# Patient Record
Sex: FEMALE | Race: BLACK OR AFRICAN AMERICAN | NOT HISPANIC OR LATINO | Employment: OTHER | URBAN - METROPOLITAN AREA
[De-identification: names, ages, dates, MRNs, and addresses within clinical notes are randomized per-mention and may not be internally consistent; named-entity substitution may affect disease eponyms.]

---

## 2020-01-29 ENCOUNTER — OFFICE VISIT (OUTPATIENT)
Dept: FAMILY MEDICINE | Facility: CLINIC | Age: 55
End: 2020-01-29
Payer: MEDICARE

## 2020-01-29 VITALS
DIASTOLIC BLOOD PRESSURE: 82 MMHG | HEIGHT: 61 IN | HEART RATE: 73 BPM | BODY MASS INDEX: 26.87 KG/M2 | SYSTOLIC BLOOD PRESSURE: 119 MMHG | OXYGEN SATURATION: 97 % | WEIGHT: 142.31 LBS | TEMPERATURE: 98 F

## 2020-01-29 DIAGNOSIS — R23.2 HOT FLASHES: ICD-10-CM

## 2020-01-29 DIAGNOSIS — N95.1 PERIMENOPAUSAL: ICD-10-CM

## 2020-01-29 DIAGNOSIS — G24.4 MEIGE'S SYNDROME (BLEPHAROSPASM WITH OROMANDIBULAR DYSTONIA): ICD-10-CM

## 2020-01-29 DIAGNOSIS — Z12.31 ENCOUNTER FOR SCREENING MAMMOGRAM FOR MALIGNANT NEOPLASM OF BREAST: ICD-10-CM

## 2020-01-29 DIAGNOSIS — D50.8 OTHER IRON DEFICIENCY ANEMIA: ICD-10-CM

## 2020-01-29 DIAGNOSIS — I10 ESSENTIAL HYPERTENSION: ICD-10-CM

## 2020-01-29 DIAGNOSIS — R73.09 ABNORMAL GLUCOSE: ICD-10-CM

## 2020-01-29 DIAGNOSIS — E66.3 OVERWEIGHT (BMI 25.0-29.9): ICD-10-CM

## 2020-01-29 DIAGNOSIS — Z00.00 ANNUAL PHYSICAL EXAM: Primary | ICD-10-CM

## 2020-01-29 PROBLEM — D50.9 IRON DEFICIENCY ANEMIA: Status: ACTIVE | Noted: 2020-01-29

## 2020-01-29 PROCEDURE — 99203 OFFICE O/P NEW LOW 30 MIN: CPT | Mod: PBBFAC,PO | Performed by: FAMILY MEDICINE

## 2020-01-29 PROCEDURE — 99999 PR PBB SHADOW E&M-NEW PATIENT-LVL III: CPT | Mod: PBBFAC,,, | Performed by: FAMILY MEDICINE

## 2020-01-29 PROCEDURE — 99203 OFFICE O/P NEW LOW 30 MIN: CPT | Mod: 25,S$PBB,, | Performed by: FAMILY MEDICINE

## 2020-01-29 PROCEDURE — 99386 PR PREVENTIVE VISIT,NEW,40-64: ICD-10-PCS | Mod: S$PBB,,, | Performed by: FAMILY MEDICINE

## 2020-01-29 PROCEDURE — 99999 PR PBB SHADOW E&M-NEW PATIENT-LVL III: ICD-10-PCS | Mod: PBBFAC,,, | Performed by: FAMILY MEDICINE

## 2020-01-29 PROCEDURE — 99203 PR OFFICE/OUTPT VISIT, NEW, LEVL III, 30-44 MIN: ICD-10-PCS | Mod: 25,S$PBB,, | Performed by: FAMILY MEDICINE

## 2020-01-29 PROCEDURE — 99386 PREV VISIT NEW AGE 40-64: CPT | Mod: S$PBB,,, | Performed by: FAMILY MEDICINE

## 2020-01-29 RX ORDER — HYDROCHLOROTHIAZIDE 25 MG/1
TABLET ORAL
COMMUNITY
Start: 2020-01-08 | End: 2020-01-29 | Stop reason: SDUPTHER

## 2020-01-29 RX ORDER — NAPROXEN 500 MG/1
TABLET ORAL
COMMUNITY
Start: 2017-06-09 | End: 2020-01-29

## 2020-01-29 RX ORDER — CARBIDOPA AND LEVODOPA 25; 100 MG/1; MG/1
TABLET ORAL
COMMUNITY
Start: 2020-01-03

## 2020-01-29 RX ORDER — ASPIRIN 81 MG/1
81 TABLET ORAL
COMMUNITY

## 2020-01-29 RX ORDER — CARBIDOPA AND LEVODOPA 25; 100 MG/1; MG/1
2 TABLET ORAL
COMMUNITY
Start: 2019-12-02 | End: 2020-01-29 | Stop reason: SDUPTHER

## 2020-01-29 RX ORDER — GABAPENTIN 300 MG/1
CAPSULE ORAL
COMMUNITY
Start: 2019-03-07

## 2020-01-29 RX ORDER — FERROUS GLUCONATE 324(38)MG
TABLET ORAL
COMMUNITY
Start: 2017-06-23

## 2020-01-29 RX ORDER — HYDROCHLOROTHIAZIDE 25 MG/1
TABLET ORAL
COMMUNITY
Start: 2018-11-26 | End: 2020-02-14 | Stop reason: SDUPTHER

## 2020-01-29 RX ORDER — CYCLOBENZAPRINE HCL 10 MG
TABLET ORAL
COMMUNITY
Start: 2019-03-07

## 2020-01-29 RX ORDER — TERBINAFINE HYDROCHLORIDE 250 MG/1
TABLET ORAL
COMMUNITY
Start: 2019-02-07

## 2020-01-29 RX ORDER — CARBIDOPA AND LEVODOPA 25; 100 MG/1; MG/1
TABLET, EXTENDED RELEASE ORAL
COMMUNITY
Start: 2019-01-04 | End: 2020-01-29 | Stop reason: SDUPTHER

## 2020-01-29 NOTE — PROGRESS NOTES
Office Visit    Patient Name: Lindsey Griffith    : 1965  MRN: [id number]      Assessment/Plan:  Lindsey Griffith is a 54 y.o. female who presents today for :    Annual physical exam  -     Hemoglobin A1c; Future; Expected date: 2020  -     CBC Without Differential; Future; Expected date: 2020  -     Comprehensive metabolic panel; Future; Expected date: 2020  -     Lipid panel; Future; Expected date: 2020  Overweight (BMI 25.0-29.9)  Encounter for screening mammogram for malignant neoplasm of breast   -     Mammo Digital Screening Bilat; Future; Expected date: 2020  -anticipatory guidance provided with age appropriate preventative services discussed, healthy diet and regular physical exercise also discussed with patient  -any additional health maintenance will be readdressed at the next physical if declined or deferred by the patient today   -Recommend 15-30 minutes of moderate intensity exercise 5 days/week.                Follow up for any evaluation as needed.          Additional Evaluation & Management issues:     In addition to today's Annual Physical, patient has other medical issues that need to be addressed, as well as their associated prescription management that is separate from today's Physical  - as documented separately below the Annual Physical portion of this encounter.        This note was created by combination of typed  and MModal dictation.  Transcription errors may be present.  If there are any questions, please contact me.        ----------------------------------------------------------------------------------------------------------------------      HPI:  Patient Care Team:  Sudeep Lowe MD as PCP - General (Family Medicine)    Lindsey is a 54 y.o. female with      Patient Active Problem List   Diagnosis    Meige's syndrome (blepharospasm with oromandibular dystonia) - follows Neurology and ENT at [facility]    Iron deficiency anemia    Essential HTN     Overweight (BMI 25.0-29.9)    Hot flashes    Perimenopausal     This patient is new to me       Patient presents today for:  HTN follow up; Hot Flashes; and Establish Care    In addition to addressing the reasons for this office visit as above, which is further discussed and addressed in the separate E&M section of this note, patient also due for annual bloodwork today.  Health maintenance-wise, she is up to date on colon cancer screening at [facility] last month, JOSIE requested today for records. She is UTD on Pap as well w/ GYN, needs MMG later this year.  Otherwise, no major new changes in health since last checkupwith prior PCP.  She has h/o Megie's syndrome, for which she follows ENT and Neurology at [facility] - stable on current meds  Patient reports that BP well controlled at home, compliant with current medication regimen daily without any adverse side effects. She denies any cardiovascular or neurologic complaints today        Additional ROS  No F/C/wt changes/fatigue  No dysphagia/sore throat/rhinorrhea  No CP/POLLARD/palpitations/swelling  No cough/wheezing/SOB  No nausea/vomiting/abd pain/no diarrhea, no constipation, no blood in stool  No muscle aches/joint pain   No rashes  No MSK weakness/HA/tingling/numbness  No anxiety/depression, +hot flashes  No dysuria/hematuria  No polyuria/polydipsia/cold or hot intolerance          Current Medications  Medications reviewed and updated.       Current Outpatient Medications:     aspirin (ECOTRIN) 81 MG EC tablet, Take 81 mg by mouth., Disp: , Rfl:     carbidopa-levodopa  mg (SINEMET)  mg per tablet, , Disp: , Rfl:     cyclobenzaprine (FLEXERIL) 10 MG tablet, , Disp: , Rfl:     ferrous gluconate (FERGON) 324 MG tablet, , Disp: , Rfl:     gabapentin (NEURONTIN) 300 MG capsule, , Disp: , Rfl:     hydroCHLOROthiazide (HYDRODIURIL) 25 MG tablet, , Disp: , Rfl:     onabotulinumtoxina (BOTOX) 100 unit SolR, 5 Units., Disp: , Rfl:     terbinafine HCl (LAMISIL)  "250 mg tablet, , Disp: , Rfl:     History reviewed. No pertinent surgical history.    History reviewed. No pertinent family history.    Social History     Socioeconomic History    Marital status:      Spouse name: Not on file    Number of children: Not on file    Years of education: Not on file    Highest education level: Not on file   Occupational History    Not on file   Social Needs    Financial resource strain: Not on file    Food insecurity:     Worry: Not on file     Inability: Not on file    Transportation needs:     Medical: Not on file     Non-medical: Not on file   Tobacco Use    Smoking status: Never Smoker    Smokeless tobacco: Never Used   Substance and Sexual Activity    Alcohol use: Never     Frequency: Never    Drug use: Never    Sexual activity: Not on file   Lifestyle    Physical activity:     Days per week: Not on file     Minutes per session: Not on file    Stress: Not on file   Relationships    Social connections:     Talks on phone: Not on file     Gets together: Not on file     Attends Jainism service: Not on file     Active member of club or organization: Not on file     Attends meetings of clubs or organizations: Not on file     Relationship status: Not on file   Other Topics Concern    Not on file   Social History Narrative    Not on file           Allergies   Review of patient's allergies indicates:  No Known Allergies          Review of Systems  See HPI      Physical Exam  /82   Pulse 73   Temp 98 °F (36.7 °C)   Ht 5' 1" (1.549 m)   Wt 64.5 kg (142 lb 4.9 oz)   LMP 12/11/2019   SpO2 97%   BMI 26.89 kg/m²       GEN: NAD, well developed, pleasant, well nourished, +dysphonia  HEENT: NCAT, PERRLA, EOMI, sclera clear, anicteric, bilateral ear exam wnl, O/P clear, MMM with no lesions  NECK: normal, supple with midline trachea, no LAD, no thyromegaly  LUNGS: CTAB, no w/r/r, no increased work of breathing   HEART: RRR, normal S1 and S2, no m/r/g, no " edema  ABD: s/nt/nd, NABS  SKIN: normal turgor, no rashes  PSYCH: AOx3, appropriate mood and affect  MSK: warm/well perfused, normal ROM in all extremities, no c/c/e.  NEURO: normal without focal findings, CN II-XII are grossly intact.  Sensation/strength grossly normal, gait and station normal.         Labs  No results found for: LABA1C, HGBA1C  No results found for: NA, K, CL, CO2, BUN, CREATININE, CALCIUM, ANIONGAP, ESTGFRAFRICA, EGFRNONAA  No results found for: CHOL  No results found for: HDL  No results found for: LDLCALC  No results found for: TRIG  No results found for: CHOLHDL  Last set of blood work has been reviewed as noted above.          __________________________________________________________________________________________________________________________________      Additional Evaluation & Management issues:     In addition to today's Annual Physical, patient has other medical issues that need to be addressed, as well as their associated prescription management that is separate from today's Physical  - as documented separately below      HPI:    Patient presents today for:  HTN follow up; Hot Flashes; and Establish Care      HTN - patient is compliant with daily HCTZ with good BP control at home - no side effects. Denies CP/SOB/leg swelling.    She has h/o Megie's syndrome - with dyphonia - Dx'd around 2016, for which she follows ENT and Neurology at [facility] - stable on current meds, which includes Sinemet/baclofen/gabapentin, she was previously on Clonazepam but was taken off. JOSIE requested today for records from [facility]   Hot flashes - mild, LMP was over a month ago, her cycles have been get more infrequent. No vaginal dryness nor discomfort.      Additional ROS  No F/C/wt changes/fatigue  No CP/POLLARD/palpitations/swelling  No cough/wheezing/SOB  No nausea/vomiting/abd pain  No muscle aches/joint pain   No rashes  No MSK weakness/HA/tingling/numbness  +hot flashes              Review of Systems  See  "HPI        Physical Exam  /82   Pulse 73   Temp 98 °F (36.7 °C)   Ht 5' 1" (1.549 m)   Wt 64.5 kg (142 lb 4.9 oz)   LMP 12/11/2019   SpO2 97%   BMI 26.89 kg/m²       GEN: NAD, well developed, pleasant, well nourished, +dysphonia  HEENT: NCAT, PERRLA, EOMI, sclera clear, anicteric  NECK: normal, supple with midline trachea, no LAD, no thyromegaly  LUNGS: CTAB, no w/r/r, no increased work of breathing   HEART: RRR, normal S1 and S2, no m/r/g, no edema  ABD: s/nt/nd, NABS  SKIN: normal turgor, no rashes  PSYCH: AOx3, appropriate mood and affect  MSK: warm/well perfused, normal ROM in all extremities, no c/c/e.                      Assessment/Plan:  Lindsey Griffith is a 54 y.o. female who presents today for :      Essential HTN  -     Hemoglobin A1c; Future; Expected date: 01/29/2020  -     CBC Without Differential; Future; Expected date: 01/29/2020  -     Comprehensive metabolic panel; Future; Expected date: 01/29/2020  -     Lipid panel; Future; Expected date: 01/29/2020  Overweight (BMI 25.0-29.9)  -continue current medication regimen  -DASH diet, regular cardiovascular exercises  -counseled on weight loss      Other iron deficiency anemia  -     CBC Without Differential; Future; Expected date: 01/29/2020  -     Ferritin; Future; Expected date: 01/29/2020  -     Iron and TIBC; Future; Expected date: 01/29/2020  -recheck labs    Meige's syndrome (blepharospasm with oromandibular dystonia) - follows Neurology and ENT at [facility]  -stable, continue current medication regimen PRN  -f/u ENT/Nuurology as needed  JOSIE requested today for [facility] records.        Hot flashes  Perimenopausal  -mild, pt declined medications, advised regular physcial activities and diet for now  Follow up PRN.  "

## 2020-01-30 ENCOUNTER — TELEPHONE (OUTPATIENT)
Dept: FAMILY MEDICINE | Facility: CLINIC | Age: 55
End: 2020-01-30

## 2020-01-30 ENCOUNTER — LAB VISIT (OUTPATIENT)
Dept: LAB | Facility: HOSPITAL | Age: 55
End: 2020-01-30
Attending: FAMILY MEDICINE
Payer: MEDICARE

## 2020-01-30 DIAGNOSIS — D50.8 OTHER IRON DEFICIENCY ANEMIA: ICD-10-CM

## 2020-01-30 DIAGNOSIS — Z00.00 ANNUAL PHYSICAL EXAM: ICD-10-CM

## 2020-01-30 DIAGNOSIS — I10 ESSENTIAL HYPERTENSION: ICD-10-CM

## 2020-01-30 DIAGNOSIS — E78.2 MIXED HYPERLIPIDEMIA: Primary | ICD-10-CM

## 2020-01-30 DIAGNOSIS — R73.03 PREDIABETES: ICD-10-CM

## 2020-01-30 DIAGNOSIS — R73.09 ABNORMAL GLUCOSE: ICD-10-CM

## 2020-01-30 LAB
ALBUMIN SERPL BCP-MCNC: 3.5 G/DL (ref 3.5–5.2)
ALP SERPL-CCNC: 101 U/L (ref 55–135)
ALT SERPL W/O P-5'-P-CCNC: 8 U/L (ref 10–44)
ANION GAP SERPL CALC-SCNC: 7 MMOL/L (ref 8–16)
AST SERPL-CCNC: 15 U/L (ref 10–40)
BILIRUB SERPL-MCNC: 0.5 MG/DL (ref 0.1–1)
BUN SERPL-MCNC: 14 MG/DL (ref 6–20)
CALCIUM SERPL-MCNC: 9.2 MG/DL (ref 8.7–10.5)
CHLORIDE SERPL-SCNC: 101 MMOL/L (ref 95–110)
CHOLEST SERPL-MCNC: 246 MG/DL (ref 120–199)
CHOLEST/HDLC SERPL: 4.1 {RATIO} (ref 2–5)
CO2 SERPL-SCNC: 32 MMOL/L (ref 23–29)
CREAT SERPL-MCNC: 0.9 MG/DL (ref 0.5–1.4)
ERYTHROCYTE [DISTWIDTH] IN BLOOD BY AUTOMATED COUNT: 13.5 % (ref 11.5–14.5)
EST. GFR  (AFRICAN AMERICAN): >60 ML/MIN/1.73 M^2
EST. GFR  (NON AFRICAN AMERICAN): >60 ML/MIN/1.73 M^2
ESTIMATED AVG GLUCOSE: 120 MG/DL (ref 68–131)
FERRITIN SERPL-MCNC: 5 NG/ML (ref 20–300)
GLUCOSE SERPL-MCNC: 100 MG/DL (ref 70–110)
HBA1C MFR BLD HPLC: 5.8 % (ref 4–5.6)
HCT VFR BLD AUTO: 38.8 % (ref 37–48.5)
HDLC SERPL-MCNC: 60 MG/DL (ref 40–75)
HDLC SERPL: 24.4 % (ref 20–50)
HGB BLD-MCNC: 11.9 G/DL (ref 12–16)
IRON SERPL-MCNC: 47 UG/DL (ref 30–160)
LDLC SERPL CALC-MCNC: 172.8 MG/DL (ref 63–159)
MCH RBC QN AUTO: 27.2 PG (ref 27–31)
MCHC RBC AUTO-ENTMCNC: 30.7 G/DL (ref 32–36)
MCV RBC AUTO: 89 FL (ref 82–98)
NONHDLC SERPL-MCNC: 186 MG/DL
PLATELET # BLD AUTO: 338 K/UL (ref 150–350)
PMV BLD AUTO: 11.7 FL (ref 9.2–12.9)
POTASSIUM SERPL-SCNC: 3.6 MMOL/L (ref 3.5–5.1)
PROT SERPL-MCNC: 7.6 G/DL (ref 6–8.4)
RBC # BLD AUTO: 4.37 M/UL (ref 4–5.4)
SATURATED IRON: 10 % (ref 20–50)
SODIUM SERPL-SCNC: 140 MMOL/L (ref 136–145)
TOTAL IRON BINDING CAPACITY: 453 UG/DL (ref 250–450)
TRANSFERRIN SERPL-MCNC: 306 MG/DL (ref 200–375)
TRIGL SERPL-MCNC: 66 MG/DL (ref 30–150)
WBC # BLD AUTO: 8.71 K/UL (ref 3.9–12.7)

## 2020-01-30 PROCEDURE — 85027 COMPLETE CBC AUTOMATED: CPT

## 2020-01-30 PROCEDURE — 36415 COLL VENOUS BLD VENIPUNCTURE: CPT | Mod: PO

## 2020-01-30 PROCEDURE — 80061 LIPID PANEL: CPT

## 2020-01-30 PROCEDURE — 83036 HEMOGLOBIN GLYCOSYLATED A1C: CPT

## 2020-01-30 PROCEDURE — 82728 ASSAY OF FERRITIN: CPT

## 2020-01-30 PROCEDURE — 80053 COMPREHEN METABOLIC PANEL: CPT

## 2020-01-30 PROCEDURE — 83540 ASSAY OF IRON: CPT

## 2020-01-30 RX ORDER — FERROUS SULFATE 325(65) MG
325 TABLET ORAL
Qty: 180 TABLET | Refills: 1 | Status: SHIPPED | OUTPATIENT
Start: 2020-01-30 | End: 2020-09-17 | Stop reason: SDUPTHER

## 2020-01-30 RX ORDER — ATORVASTATIN CALCIUM 20 MG/1
20 TABLET, FILM COATED ORAL DAILY
Qty: 90 TABLET | Refills: 3 | Status: SHIPPED | OUTPATIENT
Start: 2020-01-30 | End: 2021-02-24

## 2020-01-30 RX ORDER — DOCUSATE SODIUM 100 MG/1
100 CAPSULE, LIQUID FILLED ORAL 2 TIMES DAILY PRN
Qty: 180 CAPSULE | Refills: 1 | Status: SHIPPED | OUTPATIENT
Start: 2020-01-30

## 2020-01-30 NOTE — TELEPHONE ENCOUNTER
Please notify pt that her labs from 1/30/2020 were all normal except for A1c level being in Pre-diabetic range, which puts her at risk for diabetes in the future, therefore, I recommend dietary modification such as lowering carbohydrates in diet (pasta, rice, bread, potatoes, crackers, cookies, candy, soda) to decrease your risk of getting diabetes.    -Cholesterol is elevated, I recommend to start taking cholesterol medication to help decrease risk of cardiovascular diseases or events such as a heart attack or stroke (a prescription for the medication Atorvastatin 20mg has been sent to pharmacy).  Her anemia is mild, but iron stores are low, which I recommend consuming an iron rich-diet, such as lean red meat/fish/chicken and vegetables such spinach/soybeans/lentils/beans - I will also send in an Rx for iron supplements daily, and stool softener Colace to prevent constipation from the iron.      Mixed hyperlipidemia  -     atorvastatin (LIPITOR) 20 MG tablet; Take 1 tablet (20 mg total) by mouth once daily.  Dispense: 90 tablet; Refill: 3    Other iron deficiency anemia  -     ferrous sulfate (FEOSOL) 325 mg (65 mg iron) Tab tablet; Take 1 tablet (325 mg total) by mouth daily with breakfast.  Dispense: 180 tablet; Refill: 1  -     docusate sodium (COLACE) 100 MG capsule; Take 1 capsule (100 mg total) by mouth 2 (two) times daily as needed for Constipation. For constipation prevention (due to iron)  Dispense: 180 capsule; Refill: 1

## 2020-01-31 DIAGNOSIS — Z11.59 NEED FOR HEPATITIS C SCREENING TEST: ICD-10-CM

## 2020-02-03 NOTE — TELEPHONE ENCOUNTER
Patient informed of results message below.  She verbalized understanding and asked that a copy of this message is mailed to her.  Message mailed to Patient.

## 2020-02-07 ENCOUNTER — TELEPHONE (OUTPATIENT)
Dept: FAMILY MEDICINE | Facility: CLINIC | Age: 55
End: 2020-02-07

## 2020-02-07 NOTE — TELEPHONE ENCOUNTER
----- Message from Moriah Azul sent at 2/7/2020 12:46 PM CST -----  Contact: Self   Type: Patient Call Back    Who called: Self     What is the request in detail:  Patient is asking to speak with the office  Can the clinic reply by [facility]?  Callk  Would the patient rather a call back or a response via [facility]? Call   Best call back number: [phone]      Additional Information:

## 2020-02-07 NOTE — TELEPHONE ENCOUNTER
Patient asked what the medications ferrous sulfate and atorvastatin was ordered for.  I told her that the ferrous sulfate is for low iron and atorvastatin is for high cholesterol.  I also counseled Patient on taking each med. exactly as ordered and rec. that she eat foods low in fat and rich in fiber.  She verbalized understanding.

## 2020-02-14 ENCOUNTER — OFFICE VISIT (OUTPATIENT)
Dept: FAMILY MEDICINE | Facility: CLINIC | Age: 55
End: 2020-02-14
Payer: COMMERCIAL

## 2020-02-14 VITALS
TEMPERATURE: 98 F | HEIGHT: 61 IN | BODY MASS INDEX: 26.82 KG/M2 | OXYGEN SATURATION: 99 % | HEART RATE: 61 BPM | SYSTOLIC BLOOD PRESSURE: 122 MMHG | DIASTOLIC BLOOD PRESSURE: 78 MMHG | WEIGHT: 142.06 LBS

## 2020-02-14 DIAGNOSIS — H10.33 ACUTE BACTERIAL CONJUNCTIVITIS OF BOTH EYES: Primary | ICD-10-CM

## 2020-02-14 DIAGNOSIS — G24.4 MEIGE'S SYNDROME (BLEPHAROSPASM WITH OROMANDIBULAR DYSTONIA): ICD-10-CM

## 2020-02-14 DIAGNOSIS — I10 ESSENTIAL HYPERTENSION: ICD-10-CM

## 2020-02-14 PROCEDURE — 99214 PR OFFICE/OUTPT VISIT, EST, LEVL IV, 30-39 MIN: ICD-10-PCS | Mod: S$GLB,,, | Performed by: INTERNAL MEDICINE

## 2020-02-14 PROCEDURE — 99214 OFFICE O/P EST MOD 30 MIN: CPT | Mod: S$GLB,,, | Performed by: INTERNAL MEDICINE

## 2020-02-14 PROCEDURE — 99213 OFFICE O/P EST LOW 20 MIN: CPT | Mod: PBBFAC,PO | Performed by: INTERNAL MEDICINE

## 2020-02-14 PROCEDURE — 99999 PR PBB SHADOW E&M-EST. PATIENT-LVL III: CPT | Mod: PBBFAC,,, | Performed by: INTERNAL MEDICINE

## 2020-02-14 PROCEDURE — 99999 PR PBB SHADOW E&M-EST. PATIENT-LVL III: ICD-10-PCS | Mod: PBBFAC,,, | Performed by: INTERNAL MEDICINE

## 2020-02-14 RX ORDER — ERYTHROMYCIN 5 MG/G
OINTMENT OPHTHALMIC EVERY 6 HOURS
Qty: 3.5 G | Refills: 0 | Status: SHIPPED | OUTPATIENT
Start: 2020-02-14

## 2020-02-14 RX ORDER — HYDROCHLOROTHIAZIDE 25 MG/1
25 TABLET ORAL DAILY
Qty: 90 TABLET | Refills: 0 | Status: SHIPPED | OUTPATIENT
Start: 2020-02-14 | End: 2020-05-19

## 2020-02-14 NOTE — PROGRESS NOTES
Subjective:     Chief Complaint  Chief Complaint   Patient presents with    Eye Pain     patient c/o eye pain x 1 day       HPI  Lindsey Griffith is a 54 y.o. female with medical diagnoses as listed in the medical history and problem list that presents for above complaints.  Last clinic visit was 2020.      Eye Pain    Both eyes are affected.This is a new problem. The current episode started yesterday. The problem has been unchanged. There was no injury mechanism. Associated symptoms include an eye discharge and eye redness. Pertinent negatives include no blurred vision or fever. She has tried eye drops (over the counter) for the symptoms.   Eye Problem    Associated symptoms include an eye discharge and eye redness. Pertinent negatives include no blurred vision or fever.         Patient Care Team:  Sudeep Lowe MD as PCP - General (Family Medicine)      PAST MEDICAL HISTORY:  Past Medical History:   Diagnosis Date    Hypertension        PAST SURGICAL HISTORY:  Past Surgical History:   Procedure Laterality Date     SECTION         SOCIAL HISTORY:  Social History     Socioeconomic History    Marital status:      Spouse name: Not on file    Number of children: Not on file    Years of education: Not on file    Highest education level: Not on file   Occupational History    Not on file   Social Needs    Financial resource strain: Not on file    Food insecurity:     Worry: Not on file     Inability: Not on file    Transportation needs:     Medical: Not on file     Non-medical: Not on file   Tobacco Use    Smoking status: Never Smoker    Smokeless tobacco: Never Used   Substance and Sexual Activity    Alcohol use: Never     Frequency: Never    Drug use: Never    Sexual activity: Not on file   Lifestyle    Physical activity:     Days per week: Not on file     Minutes per session: Not on file    Stress: Not on file   Relationships    Social connections:     Talks on phone: Not on file      Gets together: Not on file     Attends Hindu service: Not on file     Active member of club or organization: Not on file     Attends meetings of clubs or organizations: Not on file     Relationship status: Not on file   Other Topics Concern    Not on file   Social History Narrative    Not on file       FAMILY HISTORY:  History reviewed. No pertinent family history.    ALLERGIES AND MEDICATIONS: updated and reviewed.  Review of patient's allergies indicates:  No Known Allergies  Current Outpatient Medications   Medication Sig Dispense Refill    aspirin (ECOTRIN) 81 MG EC tablet Take 81 mg by mouth.      atorvastatin (LIPITOR) 20 MG tablet Take 1 tablet (20 mg total) by mouth once daily. 90 tablet 3    carbidopa-levodopa  mg (SINEMET)  mg per tablet       cyclobenzaprine (FLEXERIL) 10 MG tablet       ferrous gluconate (FERGON) 324 MG tablet       ferrous sulfate (FEOSOL) 325 mg (65 mg iron) Tab tablet Take 1 tablet (325 mg total) by mouth daily with breakfast. 180 tablet 1    hydroCHLOROthiazide (HYDRODIURIL) 25 MG tablet       docusate sodium (COLACE) 100 MG capsule Take 1 capsule (100 mg total) by mouth 2 (two) times daily as needed for Constipation. For constipation prevention (due to iron) (Patient not taking: Reported on 2/14/2020) 180 capsule 1    gabapentin (NEURONTIN) 300 MG capsule       onabotulinumtoxina (BOTOX) 100 unit SolR 5 Units.      terbinafine HCl (LAMISIL) 250 mg tablet        No current facility-administered medications for this visit.          ROS:  Review of Systems   Constitutional: Negative.  Negative for chills, diaphoresis and fever.   Eyes: Positive for pain, discharge, redness and visual disturbance. Negative for blurred vision.       Objective:       Physical Exam  Vitals:    02/14/20 1404   BP: 122/78   BP Location: Left arm   Patient Position: Sitting   BP Method: Medium (Manual)   Pulse: 61   Temp: 98.1 °F (36.7 °C)   TempSrc: Oral   SpO2: 99%   Weight:  "64.4 kg (142 lb 1.4 oz)   Height: 5' 1" (1.549 m)    Body mass index is 26.85 kg/m².  Weight: 64.4 kg (142 lb 1.4 oz)   Height: 5' 1" (154.9 cm)   Physical Exam   Eyes: EOM are normal.   Erythematous conjunctivae bilaterally   Vitals reviewed.          Assessment:     1. Acute bacterial conjunctivitis of both eyes    2. Essential HTN    3. Meige's syndrome (blepharospasm with oromandibular dystonia) - follows Neurology and ENT at [facility]      Plan:     Lindsey was seen today for eye pain.    Diagnoses and all orders for this visit:    Acute bacterial conjunctivitis of both eyes  Discussed trial of antibiotic therapy - counseled regarding etiology of symptoms  -     erythromycin (ROMYCIN) ophthalmic ointment; Place into both eyes every 6 (six) hours. For conjunctivitis    Essential HTN  Sent medication refill to patient's preferred pharmacy on file.  -     hydroCHLOROthiazide (HYDRODIURIL) 25 MG tablet; Take 1 tablet (25 mg total) by mouth once daily.    Meige's syndrome (blepharospasm with oromandibular dystonia) - follows Neurology and ENT at [facility]  Discussed briefly - Neurology appt upcoming March 2020      Health Maintenance       Date Due Completion Date    Hepatitis C Screening 1965 ---    HIV Screening 11/20/1980 ---    Mammogram 11/20/2005 ---    Pap Smear with HPV Cotest 12/05/2022 12/5/2019 (Done)    Override on 12/5/2019: Done    Lipid Panel 01/30/2025 1/30/2020    Colonoscopy 12/10/2029 12/10/2019 (Done)    Override on 12/10/2019: Done ([facility] records requested)            Health Maintenance reviewed and addressed as per orders    Follow up in about 4 months (around 6/14/2020) for Hypertension.    The patient expressed understanding and no barriers to adherence were identified.     1. The patient indicates understanding of these issues and agrees with the plan. Brief care plan is updated and reviewed with the patient as applicable.     2. The patient is given an After Visit Summary that lists all " medications with directions, allergies, orders placed during this encounter and follow-up instructions.     3. I have reviewed the patient's medical information including past medical, family, and social history sections including the medications and allergies.     4. We discussed the patient's current medications. I reconciled the patient's medication list and prepared and supplied needed refills.       Og Bailon MD  Internal Medicine-Pediatrics

## 2020-02-14 NOTE — PATIENT INSTRUCTIONS
Conjunctivitis, Bacterial    You have an infection in the membranes covering the white part of the eye. This part of the eye is called the conjunctiva. The infection is called conjunctivitis. The most common symptoms of conjunctivitis include a thick, pus-like discharge from the eye, swollen eyelids, redness, eyelids sticking together upon awakening, and a gritty or scratchy feeling in the eye. Your infection was caused by bacteria. It may be treated with medicine. With treatment, the infection takes about 7 to 10 days to resolve.  Home care  · Use prescribed antibiotic eye drops or ointment as directed to treat the infection.  · Apply a warm compress (towel soaked in warm water) to the affected eye 3 to 4 times a day. Do this just before applying medicine to the eye.  · Use a warm, wet cloth to wipe away crusting of the eyelids in the morning. This is caused by mucus drainage during the night. You may also use saline irrigating solution or artificial tears to rinse away mucus in the eye. Do not put a patch over the eye.  · Wash your hands before and after touching the infected eye. This is to prevent spreading the infection to the other eye, and to other people. Do not share your towels or washcloths with others.  · You may use acetaminophen or ibuprofen to control pain, unless another medicine was prescribed. (Note: If you have chronic liver or kidney disease or have ever had a stomach ulcer or gastrointestinal bleeding, talk with your doctor before using these medicines.)  · Do not wear contact lenses until your eyes have healed and all symptoms are gone.  Follow-up care  Follow up with your healthcare provider, or as advised.  When to seek medical advice  Call your healthcare provider right away if any of these occur:  · Worsening vision  · Increasing pain in the eye  · Increasing swelling or redness of the eyelid  · Redness spreading around the eye  Date Last Reviewed: 6/14/2015  © 0737-8034 [employer]. [address] [place] [address]. All rights reserved. This information is not intended as a substitute for professional medical care. Always follow your healthcare professional's instructions.      Erythromycin eye ointment  What is this medicine?  ERYTHROMYCIN (er harsha lorna MYE sin) is a macrolide antibiotic. It is used to treat bacterial eye infections. It also prevents a certain type of eye infection that can occur in some babies.  How should I use this medicine?  This medicine is only for use in the eye. Follow the directions on the prescription label. Wash hands before and after use. Tilt your head back slightly and pull your lower eyelid down with your index finger to form a pouch. Try not to touch the tip of the tube, to your eye, fingertips, or any other surface. Squeeze the end of the tube to apply a thin layer of the ointment to the inside of the lower eyelid. Close the eye gently to spread the ointment. Your vision may blur for a few minutes. Use your doses at regular intervals. Do not use your medicine more often than directed. Finish the full course prescribed by your doctor or health care professional even if you think your condition is better. Do not stop using except on the advice of your doctor or health care professional.  Talk to your pediatrician regarding the use of this medicine in children. Special care may be needed.  What side effects may I notice from receiving this medicine?  Side effects that you should report to your doctor or health care professional as soon as possible:  · allergic reactions like skin rash, itching or hives, swelling of the face, lips, or tongue  · burning, stinging, or itching of the eyes or eyelids  · changes in vision  · redness, swelling, or pain  What may interact with this medicine?  Interactions are not expected. Do not use any other eye products without telling your doctor or health care professional.  What if I miss a dose?  If you miss a  dose, use it as soon as you can. If it is almost time for your next dose, use only that dose. Do not use double or extra doses.  Where should I keep my medicine?  Keep out of the reach of children.  Store at room temperature between 15 and 30 degrees C (59 and 86 degrees F). Do not freeze. Throw away any unused ointment after the expiration date.  What should I tell my health care provider before I take this medicine?  · if you have an unusual or allergic reaction to erythromycin, foods, dyes, or preservatives  · pregnant or trying to get pregnant  · breast-feeding  What should I watch for while using this medicine?  Tell your doctor or health care professional if your symptoms do not improve in 2 to 3 days.  NOTE:This sheet is a summary. It may not cover all possible information. If you have questions about this medicine, talk to your doctor, pharmacist, or health care provider. Copyright© 2017 [employer]

## 2020-05-05 ENCOUNTER — TELEPHONE (OUTPATIENT)
Dept: FAMILY MEDICINE | Facility: CLINIC | Age: 55
End: 2020-05-05

## 2020-05-15 DIAGNOSIS — I10 ESSENTIAL HYPERTENSION: ICD-10-CM

## 2020-05-19 RX ORDER — HYDROCHLOROTHIAZIDE 25 MG/1
25 TABLET ORAL DAILY
Qty: 90 TABLET | Refills: 0 | Status: SHIPPED | OUTPATIENT
Start: 2020-05-19 | End: 2020-06-03 | Stop reason: SDUPTHER

## 2020-06-03 DIAGNOSIS — I10 ESSENTIAL HYPERTENSION: ICD-10-CM

## 2020-06-03 NOTE — TELEPHONE ENCOUNTER
Pharmacy states that they never received the patient rx request.      Please address patient's refill request.    Thanks,  Evelyn

## 2020-06-03 NOTE — TELEPHONE ENCOUNTER
----- Message from Xochitl Rincon sent at 6/3/2020  9:39 AM CDT -----  Contact: [facility]-  Type:  Pharmacy Calling to Clarify an RX    Name of Caller: Coier    Pharmacy Name: [facility]    Prescription Name: hydroCHLOROthiazide     What do they need to clarify? Refill    Can you be contacted via [facility]? No    Best Call Back Number: [phone] (home)     Additional Information:   [id number] - [place] [address] AT [address]  [place] [address]  Phone: [phone] Fax: [phone]

## 2020-06-04 RX ORDER — HYDROCHLOROTHIAZIDE 25 MG/1
25 TABLET ORAL DAILY
Qty: 90 TABLET | Refills: 0 | Status: SHIPPED | OUTPATIENT
Start: 2020-06-04 | End: 2020-08-28

## 2020-06-08 ENCOUNTER — PATIENT OUTREACH (OUTPATIENT)
Dept: ADMINISTRATIVE | Facility: HOSPITAL | Age: 55
End: 2020-06-08

## 2020-06-09 ENCOUNTER — PATIENT OUTREACH (OUTPATIENT)
Dept: ADMINISTRATIVE | Facility: HOSPITAL | Age: 55
End: 2020-06-09

## 2020-09-17 ENCOUNTER — OFFICE VISIT (OUTPATIENT)
Dept: FAMILY MEDICINE | Facility: CLINIC | Age: 55
End: 2020-09-17
Payer: MEDICARE

## 2020-09-17 VITALS
TEMPERATURE: 98 F | DIASTOLIC BLOOD PRESSURE: 70 MMHG | BODY MASS INDEX: 24.55 KG/M2 | OXYGEN SATURATION: 98 % | SYSTOLIC BLOOD PRESSURE: 129 MMHG | HEIGHT: 61 IN | WEIGHT: 130.06 LBS | HEART RATE: 63 BPM

## 2020-09-17 DIAGNOSIS — E78.2 MIXED HYPERLIPIDEMIA: ICD-10-CM

## 2020-09-17 DIAGNOSIS — R73.03 PREDIABETES: ICD-10-CM

## 2020-09-17 DIAGNOSIS — D50.8 OTHER IRON DEFICIENCY ANEMIA: ICD-10-CM

## 2020-09-17 DIAGNOSIS — I10 ESSENTIAL HYPERTENSION: Primary | ICD-10-CM

## 2020-09-17 DIAGNOSIS — Z00.00 ANNUAL PHYSICAL EXAM: ICD-10-CM

## 2020-09-17 PROCEDURE — 3078F DIAST BP <80 MM HG: CPT | Mod: CPTII,S$GLB,, | Performed by: FAMILY MEDICINE

## 2020-09-17 PROCEDURE — 3074F SYST BP LT 130 MM HG: CPT | Mod: CPTII,S$GLB,, | Performed by: FAMILY MEDICINE

## 2020-09-17 PROCEDURE — 3078F PR MOST RECENT DIASTOLIC BLOOD PRESSURE < 80 MM HG: ICD-10-PCS | Mod: CPTII,S$GLB,, | Performed by: FAMILY MEDICINE

## 2020-09-17 PROCEDURE — 3008F BODY MASS INDEX DOCD: CPT | Mod: CPTII,S$GLB,, | Performed by: FAMILY MEDICINE

## 2020-09-17 PROCEDURE — 99999 PR PBB SHADOW E&M-EST. PATIENT-LVL III: ICD-10-PCS | Mod: PBBFAC,,, | Performed by: FAMILY MEDICINE

## 2020-09-17 PROCEDURE — 99214 PR OFFICE/OUTPT VISIT, EST, LEVL IV, 30-39 MIN: ICD-10-PCS | Mod: S$GLB,,, | Performed by: FAMILY MEDICINE

## 2020-09-17 PROCEDURE — 99214 OFFICE O/P EST MOD 30 MIN: CPT | Mod: S$GLB,,, | Performed by: FAMILY MEDICINE

## 2020-09-17 PROCEDURE — 99999 PR PBB SHADOW E&M-EST. PATIENT-LVL III: CPT | Mod: PBBFAC,,, | Performed by: FAMILY MEDICINE

## 2020-09-17 PROCEDURE — 3008F PR BODY MASS INDEX (BMI) DOCUMENTED: ICD-10-PCS | Mod: CPTII,S$GLB,, | Performed by: FAMILY MEDICINE

## 2020-09-17 PROCEDURE — 3074F PR MOST RECENT SYSTOLIC BLOOD PRESSURE < 130 MM HG: ICD-10-PCS | Mod: CPTII,S$GLB,, | Performed by: FAMILY MEDICINE

## 2020-09-17 RX ORDER — CARBIDOPA AND LEVODOPA 25; 100 MG/1; MG/1
2 TABLET, EXTENDED RELEASE ORAL
COMMUNITY
Start: 2020-04-28 | End: 2020-09-17 | Stop reason: SDUPTHER

## 2020-09-17 RX ORDER — FERROUS SULFATE 325(65) MG
325 TABLET ORAL
Qty: 180 TABLET | Refills: 1
Start: 2020-09-17

## 2020-09-17 RX ORDER — NAPROXEN 500 MG/1
TABLET ORAL
COMMUNITY
Start: 2020-07-03

## 2020-09-17 NOTE — PROGRESS NOTES
Office Visit    Patient Name: Lindsey Griffith    : 1965  MRN: [id number]      Assessment/Plan:  Lindsey Griffith is a 54 y.o. female who presents today for :    Essential HTN  -     Hemoglobin A1C; Future; Expected date: 2020  -     CBC Without Differential; Future; Expected date: 2020  -     Comprehensive metabolic panel; Future; Expected date: 2020  -     Lipid Panel; Future; Expected date: 2020  Mixed hyperlipidemia  -     Lipid Panel; Future; Expected date: 2020  Prediabetes  -     Hemoglobin A1C; Future; Expected date: 2020  -continue current medication regimen  -DASH diet, regular cardiovascular exercises    Other iron deficiency anemia  -     CBC Without Differential; Future; Expected date: 2020  -     Ferritin; Future  -     Iron and TIBC; Future  -     ferrous sulfate (FEOSOL) 325 mg (65 mg iron) Tab tablet; Take 1 tablet (325 mg total) by mouth daily with breakfast.  Dispense: 180 tablet; Refill: 1       Meige's syndrome (blepharospasm with oromandibular dystonia) - follows Neurology and ENT at [facility]  -stable, continue current medication regimen PRN and f/u with ENT/Neurology as needed.        Follow up 4-5 months    This note was created by combination of typed  and MModal dictation.  Transcription errors may be present.  If there are any questions, please contact me.      ----------------------------------------------------------------------------------------------------------------------      HPI:  Patient Care Team:  Sudeep Lowe MD as PCP - General (Family Medicine)  Deanna Ledezma MA as Care Coordinator    Lindsey is a 54 y.o. female with      Patient Active Problem List   Diagnosis    Meige's syndrome (blepharospasm with oromandibular dystonia) - follows Neurology and ENT at [facility]    Iron deficiency anemia    Essential HTN    Overweight (BMI 25.0-29.9)    Hot flashes    Perimenopausal    Mixed hyperlipidemia    Prediabetes          Patient presents today for HTN  Follow-up    HTN - patient is compliant with HCTZ daily with good BP control at home - no side effects. Denies CP/SOB/leg swelling - she is also tolerating statin well without any issues. She had been able to eat better with a healthier diet and has lost about 11 lbs since her last visit. She takes her iron supplements daily as prescribed, due for lab check as well as repeat iron panel.        Additional ROS    No F/C/wt changes/fatigue  No dysphagia/sore throat  No CP/POLLARD/palpitations/swelling  No cough/wheezing/SOB  No nausea/vomiting/abd pain/no diarrhea, no constipation  No MSK weakness/HA/tingling/numbness  No rashes  No anxiety/depression  No dysuria/hematuria              Patient Active Problem List   Diagnosis    Meige's syndrome (blepharospasm with oromandibular dystonia) - follows [facility]rology and E[facility] at [facility]    Iron deficiency anemia    Essential HTN    Overweight (BMI 25.0-29.9)    Hot flashes    Perimenopausal    Mixed hyperlipidemia    Prediabetes       Current Medications  Medications reviewed/updated.     Current Outpatient Medications on File Prior to Visit   Medication Sig Dispense Refill    aspirin (ECOTRIN) 81 MG EC tablet Take 81 mg by mouth.      atorvastatin (LIPITOR) 20 MG tablet Take 1 tablet (20 mg total) by mouth once daily. 90 tablet 3    carbidopa-levodopa  mg (SINEMET)  mg per tablet       cyclobenzaprine (FLEXERIL) 10 MG tablet       docusate sodium (COLACE) 100 MG capsule Take 1 capsule (100 mg total) by mouth 2 (two) times daily as needed for Constipation. For constipation prevention (due to iron) 180 capsule 1    erythromycin (ROMYCIN) ophthalmic ointment Place into both eyes every 6 (six) hours. For conjunctivitis 3.5 g 0    ferrous gluconate (FERGON) 324 MG tablet       gabapentin (NEURONTIN) 300 MG capsule       hydroCHLOROthiazide (HYDRODIURIL) 25 MG tablet Take 1 tablet (25 mg total) by mouth once daily. Followup  Dr.T Lowe 2021 for refills 120 tablet 0    naproxen (NAPROSYN) 500 MG tablet       onabotulinumtoxina (BOTOX) 100 unit SolR 5 Units.      terbinafine HCl (LAMISIL) 250 mg tablet       [DISCONTINUED] carbidopa-levodopa  mg (SINEMET CR)  mg TbSR Take 2 tablets by mouth.      [DISCONTINUED] ferrous sulfate (FEOSOL) 325 mg (65 mg iron) Tab tablet Take 1 tablet (325 mg total) by mouth daily with breakfast. 180 tablet 1     No current facility-administered medications on file prior to visit.            Past Surgical History:   Procedure Laterality Date     SECTION         History reviewed. No pertinent family history.    Social History     Socioeconomic History    Marital status:      Spouse name: Not on file    Number of children: Not on file    Years of education: Not on file    Highest education level: Not on file   Occupational History    Not on file   Social Needs    Financial resource strain: Not on file    Food insecurity     Worry: Not on file     Inability: Not on file    Transportation needs     Medical: Not on file     Non-medical: Not on file   Tobacco Use    Smoking status: Never Smoker    Smokeless tobacco: Never Used   Substance and Sexual Activity    Alcohol use: Never     Frequency: Never    Drug use: Never    Sexual activity: Not on file   Lifestyle    Physical activity     Days per week: Not on file     Minutes per session: Not on file    Stress: Not on file   Relationships    Social connections     Talks on phone: Not on file     Gets together: Not on file     Attends Christianity service: Not on file     Active member of club or organization: Not on file     Attends meetings of clubs or organizations: Not on file     Relationship status: Not on file   Other Topics Concern    Not on file   Social History Narrative    Not on file             Allergies   Review of patient's allergies indicates:  No Known Allergies          Review of Systems  See  "HPI      Physical Exam  /70   Pulse 63   Temp 97.9 °F (36.6 °C)   Ht 5' 1" (1.549 m)   Wt 59 kg (130 lb 1.1 oz)   LMP 08/01/2020   SpO2 98%   BMI 24.58 kg/m²       GEN: NAD, well developed, pleasant, well nourished  HEENT: NCAT, PERRLA, EOMI, sclera clear, anicteric, O/P clear, MMM with no lesions  NECK: normal, supple with midline trachea, no LAD, no thyromegaly  LUNGS: CTAB, no w/r/r, normal respiratory effort  HEART: RRR, normal S1 and S2, no m/r/g, no palpitations, no edema  ABD: s/nt/nd, NABS, no organomegaly  SKIN: warm and dry with normal turgor, no rashes, no other lesions.   PSYCH: AOx3, appropriate mood and affect.   MSK: extremities warm/well perfused, normal ROM in all 4 extremities, no c/c/e.   NEURO: normal without focal findings, CN II-XII are intact.  Sensation grossly normal, gait and station normal.           "

## 2020-09-17 NOTE — PROGRESS NOTES
Office Visit    Patient Name: Lindsey Griffith    : 1965  MRN: [id number]      Assessment/Plan:  Lindsey Griffith is a 54 y.o. female who presents today for :    Essential HTN  -     Hemoglobin A1C; Future; Expected date: 2020  -     CBC Without Differential; Future; Expected date: 2020  -     Comprehensive metabolic panel; Future; Expected date: 2020  -     Lipid Panel; Future; Expected date: 2020    Other iron deficiency anemia  -     CBC Without Differential; Future; Expected date: 2020  -     Ferritin; Future  -     Iron and TIBC; Future    Mixed hyperlipidemia  -     Lipid Panel; Future; Expected date: 2020    Prediabetes  -     Hemoglobin A1C; Future; Expected date: 2020    Annual physical exam  -     HIV 1/2 Ag/Ab (4th Gen); Future; Expected date: 2020  -     Hepatitis C Antibody; Future; Expected date: 2020            Follow up ***for worsening Sx. Urgent care/ED precautions provided.  ***mo    This note was created by combination of typed  and MModal dictation.  Transcription errors may be present.  If there are any questions, please contact me.      ----------------------------------------------------------------------------------------------------------------------      HPI:  Patient Care Team:  Sudeep Lowe MD as PCP - General (Family Medicine)  Deanna Ledezma MA as Care Coordinator    Lindsey is a 54 y.o. female with    ***  Patient Active Problem List   Diagnosis    Meige's syndrome (blepharospasm with oromandibular dystonia) - follows Neurology and ENT at [facility]    Iron deficiency anemia    Essential HTN    Overweight (BMI 25.0-29.9)    Hot flashes    Perimenopausal    Mixed hyperlipidemia    Prediabetes     ***no significant medical history  ***This patient is new to me       Patient presents today for ***f/u of:  Follow-up      ***  DM - patient is ***compliant with ***, no side effects   ***daily FBG: ***below 130  No  polyuria/polydipsia. No foot numbness/tingling/vision changes/hypoglycemia      Hemoglobin A1C   Date Value Ref Range Status   01/30/2020 5.8 (H) 4.0 - 5.6 % Final     Comment:     ADA Screening Guidelines:  5.7-6.4%  Consistent with prediabetes  >or=6.5%  Consistent with diabetes  High levels of fetal hemoglobin interfere with the HbA1C  assay. Heterozygous hemoglobin variants (HbS, HgC, etc)do  not significantly interfere with this assay.   However, presence of multiple variants may affect accuracy.         HTN - *** compliant with med***s as prescribed, denies any side effects.  ***120s/80s per BP log at home.  No CP/SOB/POLLARD/vision changes/urinary changes/leg swelling. ***Patient is ***cutting down on salt-intake.    HLD - tolerating statin well without any issues          Additional ROS    No F/C/wt changes/fatigue  No dysphagia/sore throat  No CP/POLLARD/palpitations/swelling  No cough/wheezing/SOB  No nausea/vomiting/abd pain/no diarrhea, no constipation, no blood in stool  No MSK weakness/HA/tingling/numbness  No rashes  No anxiety/depression  No dysuria/hematuria              Patient Active Problem List   Diagnosis    Meige's syndrome (blepharospasm with oromandibular dystonia) - follows Neurology and ENT at [facility]    Iron deficiency anemia    Essential HTN    Overweight (BMI 25.0-29.9)    Hot flashes    Perimenopausal    Mixed hyperlipidemia    Prediabetes       Current Medications  Medications reviewed/updated.     Current Outpatient Medications on File Prior to Visit   Medication Sig Dispense Refill    aspirin (ECOTRIN) 81 MG EC tablet Take 81 mg by mouth.      atorvastatin (LIPITOR) 20 MG tablet Take 1 tablet (20 mg total) by mouth once daily. 90 tablet 3    carbidopa-levodopa  mg (SINEMET)  mg per tablet       cyclobenzaprine (FLEXERIL) 10 MG tablet       docusate sodium (COLACE) 100 MG capsule Take 1 capsule (100 mg total) by mouth 2 (two) times daily as needed for Constipation. For  constipation prevention (due to iron) 180 capsule 1    erythromycin (ROMYCIN) ophthalmic ointment Place into both eyes every 6 (six) hours. For conjunctivitis 3.5 g 0    ferrous gluconate (FERGON) 324 MG tablet       ferrous sulfate (FEOSOL) 325 mg (65 mg iron) Tab tablet Take 1 tablet (325 mg total) by mouth daily with breakfast. 180 tablet 1    gabapentin (NEURONTIN) 300 MG capsule       hydroCHLOROthiazide (HYDRODIURIL) 25 MG tablet Take 1 tablet (25 mg total) by mouth once daily. Followup Dr.T Lowe 2021 for refills 120 tablet 0    naproxen (NAPROSYN) 500 MG tablet       onabotulinumtoxina (BOTOX) 100 unit SolR 5 Units.      terbinafine HCl (LAMISIL) 250 mg tablet       [DISCONTINUED] carbidopa-levodopa  mg (SINEMET CR)  mg TbSR Take 2 tablets by mouth.       No current facility-administered medications on file prior to visit.            Past Surgical History:   Procedure Laterality Date     SECTION         History reviewed. No pertinent family history.    Social History     Socioeconomic History    Marital status:      Spouse name: Not on file    Number of children: Not on file    Years of education: Not on file    Highest education level: Not on file   Occupational History    Not on file   Social Needs    Financial resource strain: Not on file    Food insecurity     Worry: Not on file     Inability: Not on file    Transportation needs     Medical: Not on file     Non-medical: Not on file   Tobacco Use    Smoking status: Never Smoker    Smokeless tobacco: Never Used   Substance and Sexual Activity    Alcohol use: Never     Frequency: Never    Drug use: Never    Sexual activity: Not on file   Lifestyle    Physical activity     Days per week: Not on file     Minutes per session: Not on file    Stress: Not on file   Relationships    Social connections     Talks on phone: Not on file     Gets together: Not on file     Attends [employer] service: Not on file  "    Active member of club or organization: Not on file     Attends meetings of clubs or organizations: Not on file     Relationship status: Not on file   Other Topics Concern    Not on file   Social History Narrative    Not on file             Allergies   Review of patient's allergies indicates:  No Known Allergies          Review of Systems  See HPI      Physical Exam  /70   Pulse 63   Temp 97.9 °F (36.6 °C)   Ht 5' 1" (1.549 m)   Wt 59 kg (130 lb 1.1 oz)   LMP 08/01/2020   SpO2 98%   BMI 24.58 kg/m²       GEN: NAD, well developed, pleasant, well nourished  HEENT: NCAT, PERRLA, EOMI, sclera clear, anicteric, O/P clear, MMM with no lesions  NECK: normal, supple with midline trachea, no LAD, no thyromegaly  LUNGS: CTAB, no w/r/r, normal respiratory effort  HEART: RRR, normal S1 and S2, no m/r/g, no palpitations, no edema  ABD: s/nt/nd, NABS, no organomegaly  SKIN: warm and dry with normal turgor, no rashes, no other lesions.   PSYCH: AOx3, appropriate mood and affect.   MSK: extremities warm/well perfused, normal ROM in all 4 extremities, no c/c/e.   NEURO: normal without focal findings, CN II-XII are intact.  Sensation grossly normal, gait and station normal.       "

## 2020-10-16 ENCOUNTER — TELEPHONE (OUTPATIENT)
Dept: FAMILY MEDICINE | Facility: CLINIC | Age: 55
End: 2020-10-16

## 2020-10-16 NOTE — TELEPHONE ENCOUNTER
Pt called stating someone called her about her appt with Dr DANIEL Lowe on Tuesday 10/20/2020 I informed pt she was not on schedule and she verbally understand and stated everything was ok

## 2022-03-16 DIAGNOSIS — Z11.59 NEED FOR HEPATITIS C SCREENING TEST: ICD-10-CM

## 2022-03-16 DIAGNOSIS — I10 ESSENTIAL HYPERTENSION: ICD-10-CM

## 2023-06-05 ENCOUNTER — PATIENT OUTREACH (OUTPATIENT)
Dept: ADMINISTRATIVE | Facility: HOSPITAL | Age: 58
End: 2023-06-05
Payer: MEDICARE